# Patient Record
Sex: FEMALE | Race: WHITE | NOT HISPANIC OR LATINO | Employment: FULL TIME
[De-identification: names, ages, dates, MRNs, and addresses within clinical notes are randomized per-mention and may not be internally consistent; named-entity substitution may affect disease eponyms.]

---

## 2021-04-29 ENCOUNTER — TELEMEDICINE (OUTPATIENT)
Dept: TELEHEALTH | Facility: TELEMEDICINE | Age: 36
End: 2021-04-29
Payer: COMMERCIAL

## 2021-04-29 DIAGNOSIS — B02.9 HERPES ZOSTER WITHOUT COMPLICATION: ICD-10-CM

## 2021-04-29 PROCEDURE — 99203 OFFICE O/P NEW LOW 30 MIN: CPT | Mod: 95 | Performed by: NURSE PRACTITIONER

## 2021-04-29 RX ORDER — VALACYCLOVIR HYDROCHLORIDE 1 G/1
1000 TABLET, FILM COATED ORAL 3 TIMES DAILY
Qty: 21 TABLET | Refills: 0 | Status: SHIPPED | OUTPATIENT
Start: 2021-04-29 | End: 2021-05-06

## 2021-04-29 RX ORDER — IBUPROFEN 800 MG/1
800 TABLET ORAL
COMMUNITY
Start: 2021-04-14

## 2021-04-29 ASSESSMENT — ENCOUNTER SYMPTOMS
FEVER: 0
CHILLS: 0

## 2021-04-29 NOTE — PROGRESS NOTES
Subjective:      Malu Leon is a 36 y.o. female who presents with No chief complaint on file.            aMlu presents for a virtual visit.  Identification was verified.  Patient was informed that encounter would be conducted over a secure, encrypted network over Zoom and consent was obtained.  She reports a 5 day history of pruritic, painful skin sensitivity with eruption of vesicular rash on day 3 on her left mid-back, flank, and abdomen.  She denies any fever, chills, or myalgias.  No new soaps, lotions, or detergents.  No history of skin sensitivities.  No exposure to chemicals, plants, or irritants.        Review of Systems   Constitutional: Negative for chills, fever and malaise/fatigue.   Skin: Positive for itching and rash.     Medications, Allergies, and current problem list reviewed today in Epic     Objective:     There were no vitals taken for this visit.     Physical Exam  Constitutional:       General: She is not in acute distress.     Appearance: Normal appearance. She is not ill-appearing, toxic-appearing or diaphoretic.   Pulmonary:      Effort: Pulmonary effort is normal.   Skin:     Findings: Rash present. Rash is vesicular.             Comments: Vesicular rash of the left mid-back, flank and abdomen typical of shingles.  No evidence of secondary bacterial infection.  No other rash or lesions.   Neurological:      Mental Status: She is alert and oriented to person, place, and time.   Psychiatric:         Mood and Affect: Mood normal.         Behavior: Behavior normal.                 Assessment/Plan:        1. Herpes zoster without complication  - valacyclovir (VALTREX) 1 GM Tab; Take 1 tablet by mouth 3 times a day for 7 days.  Dispense: 21 tablet; Refill: 0    Reviewed exam findings with Malu.  Differential reviewed.  Valtrex as prescribed.  OTC NSAIDs or tylenol prn pain.  Maintain adequate po hydration.  RTC in 7 days if symptoms persist, sooner if worse.  Follow up in 1 month for any  persistent post herpetic neuralgia symptoms.  She verbalized understanding of and agreed with plan of care.